# Patient Record
Sex: FEMALE | Race: BLACK OR AFRICAN AMERICAN | URBAN - METROPOLITAN AREA
[De-identification: names, ages, dates, MRNs, and addresses within clinical notes are randomized per-mention and may not be internally consistent; named-entity substitution may affect disease eponyms.]

---

## 2020-02-13 ENCOUNTER — OFFICE VISIT (OUTPATIENT)
Dept: CARDIOLOGY CLINIC | Age: 85
End: 2020-02-13

## 2020-02-13 ENCOUNTER — TELEPHONE (OUTPATIENT)
Dept: CARDIOLOGY CLINIC | Age: 85
End: 2020-02-13

## 2020-02-13 VITALS
BODY MASS INDEX: 27.94 KG/M2 | HEIGHT: 61 IN | HEART RATE: 60 BPM | WEIGHT: 148 LBS | OXYGEN SATURATION: 94 % | RESPIRATION RATE: 18 BRPM | SYSTOLIC BLOOD PRESSURE: 136 MMHG | DIASTOLIC BLOOD PRESSURE: 82 MMHG

## 2020-02-13 DIAGNOSIS — I25.10 ASHD (ARTERIOSCLEROTIC HEART DISEASE): Primary | ICD-10-CM

## 2020-02-13 DIAGNOSIS — I50.22 SYSTOLIC CHF, CHRONIC (HCC): ICD-10-CM

## 2020-02-13 DIAGNOSIS — R07.9 CHEST PAIN, UNSPECIFIED TYPE: ICD-10-CM

## 2020-02-13 RX ORDER — ASPIRIN 81 MG/1
TABLET ORAL DAILY
COMMUNITY

## 2020-02-13 RX ORDER — ROSUVASTATIN CALCIUM 10 MG/1
10 TABLET, COATED ORAL
COMMUNITY

## 2020-02-13 RX ORDER — FUROSEMIDE 20 MG/1
TABLET ORAL DAILY
COMMUNITY

## 2020-02-13 RX ORDER — MOMETASONE FUROATE 50 UG/1
2 SPRAY, METERED NASAL DAILY
COMMUNITY

## 2020-02-13 RX ORDER — RAMIPRIL 10 MG/1
10 CAPSULE ORAL DAILY
COMMUNITY

## 2020-02-13 RX ORDER — ISOSORBIDE MONONITRATE 60 MG/1
TABLET, EXTENDED RELEASE ORAL
COMMUNITY

## 2020-02-13 RX ORDER — CLOPIDOGREL BISULFATE 75 MG/1
75 TABLET ORAL DAILY
COMMUNITY

## 2020-02-13 RX ORDER — CARVEDILOL 25 MG/1
25 TABLET ORAL 2 TIMES DAILY WITH MEALS
COMMUNITY

## 2020-02-13 RX ORDER — COLCHICINE 0.6 MG/1
0.6 TABLET ORAL DAILY
COMMUNITY

## 2020-02-13 RX ORDER — ACETAMINOPHEN 325 MG/1
650 TABLET ORAL
COMMUNITY

## 2020-02-13 RX ORDER — ALLOPURINOL 100 MG/1
TABLET ORAL DAILY
COMMUNITY

## 2020-02-13 RX ORDER — AZELASTINE 1 MG/ML
1 SPRAY, METERED NASAL 2 TIMES DAILY
COMMUNITY

## 2020-02-13 RX ORDER — RANITIDINE 150 MG/1
150 TABLET, FILM COATED ORAL 2 TIMES DAILY
COMMUNITY

## 2020-02-13 RX ORDER — DOCUSATE SODIUM 100 MG/1
100 CAPSULE, LIQUID FILLED ORAL 2 TIMES DAILY
COMMUNITY

## 2020-02-13 RX ORDER — NAPROXEN SODIUM 220 MG
220 TABLET ORAL 2 TIMES DAILY WITH MEALS
COMMUNITY

## 2020-02-13 NOTE — TELEPHONE ENCOUNTER
Spoke with Sony Barrios at [facility] with Radiology, ph # [phone]. Cardiac cath was done 1- however, states it has not been read yet. Gio transferred me to [phone] to Radha Lambert. States we could not see cath films because they had not been released in the system. Have now been linked via [address] [place] [address] [address] and we should be able to pull up and see actual cath in [place]. Will call them back if there is a problem.

## 2020-02-13 NOTE — PROGRESS NOTES
[facility] @ [facility]    Betito Ly DNP, ANP-BC  Subjective/HPI:     Payton Templeton is a 80 y.o. female is here for new patient consultation. Patient was admitted to [facility] January 13, 2020 onset of chest tightness  with associated limiting dyspnea on exertion, increasing fatigue and increasing pedal edema. The ER work-up was notable for elevated troponin 0.68, elevated d-dimer with negative CTA of chest, bilateral lower extremity duplex negative for DVTs. Chest x-ray noted CHF pattern and was admitted. Underwent cardiac catheterization for non-STEMI as well as drop in ejection fraction previously normal most recent admission 44%. Cardiac catheterization as reported below shows a  of the proximal RCA. Since hospital discharge she has maintained carvedilol, ACE inhibitor, diuretic long-acting nitrates. She continues to experience fatigue and intermittent dyspnea, unable to clean her home as she did prior to December. She reports the edema has resolved. She is never had any orthopnea or paroxysmal nocturnal dyspnea. Cardiac Cath Report [facility] 1/2020 admission for NSTEMI  1. Coronary artery disease: Left main normal, left anterior   descending artery irregularities with total occlusion at the very   distal vessel at the apex and a small diameter heavily calcified   vessel not suitable for PCI, circumflex codominant with   irregularities, RCA codominant with proximal total occlusion and   a heavily calcified small diameter vessel.  I am uncertain if her   recent symptoms, troponin elevation, wall motion abnormalities   are related to ischemic event or possibly due to stress   cardiomyopathy.  She should have medical management. ECHO 1/2020 [place]  1. DECREASED LEFT VENTRICULAR CAVITY SIZE WITH MILDLY INCREASED WALL THICKNESS.    2. MILDLY REDUCED LEFT VENTRICULAR SYSTOLIC FUNCTION.  LATERAL AND ANTERIOR HYPOKINESIS.    3. EJECTION FRACTION IS 44%.  4. ABNORMAL DIASTOLIC FILLING PATTERN FOR AGE.    5. NORMAL RIGHT VENTRICULAR SIZE AND FUNCTION.    6. SEVERE MITRAL ANNULAR CALCIFICATION. MEAN TRANSVALVULAR GRADIENT IS 3 MMHG.    7. AORTIC VALVE SCLEROSIS WITHOUT STENOSIS.    8. ESTIMATED PULMONARY ARTERY PRESSURE IS 52  MMHG.        COMPARED TO PREVIOUS ECHO FROM 8/6/12: EF HAS CHANGED FROM 55-60% TO 44%, PAP HAS INCREASED    FROM 34 MMHG TO 52 MMHG      Lower Extremity Ultrasound: 1/2020  Addendum by Matthew Davis MD on 01/13/2020 6:50 PM    Bilateral: The deep venous system of the bilateral lower extremities was     examined using duplex ultrasound.      B-mode imaging demonstrates normal compressibility of the common femoral,     femoral, deep femoral, popliteal, posterior tibial, and peroneal veins.     There is no thrombus identified in the lower extremities.  Doppler flow     signals are spontaneous and pulsatile at all levels.    Right: A non-vascularized collection was noted in the right popliteal     fossa with mixed density echos measuring 4.0 cm x 0.657 cm. Venous     valvular reflux >500 ms was identified in the right great saphenous vein     at the saphenofemoral junction.    Left: A non-vascularized collection was noted in the left popliteal fossa     with mixed density echos measuring 3.83 cm x 1.26 cm.    Conclusions: No evidence of deep venous thrombosis in the bilateral lower     extremities.    Venous valvular reflux in the right as described.    Hemodynamics in the bilateral lower extremities are consistent with     increased central venous pressure.    Non-vascularized collections in the bilateral lower extremity as     described.    When compared to the previous exam performed on 8/1/2018, there is no     significant change.        PCP Provider  No primary care provider on file.   Past Medical History:   Diagnosis Date    ASHD (arteriosclerotic heart disease) 01/2020    NSTEMI,   RCA    Gout     High cholesterol     HTN (hypertension)       No past surgical history on file. Not on File   No family history on file. Current Outpatient Medications   Medication Sig    aspirin delayed-release 81 mg tablet Take  by mouth daily.  calcium-cholecalciferol, d3, (CALCIUM 600 + D) 600-125 mg-unit tab Take  by mouth.  colchicine 0.6 mg tablet Take 0.6 mg by mouth daily.  carvediloL (COREG) 25 mg tablet Take 25 mg by mouth two (2) times daily (with meals).  raNITIdine (ZANTAC) 150 mg tablet Take 150 mg by mouth two (2) times a day.  allopurinoL (ZYLOPRIM) 100 mg tablet Take  by mouth daily.  isosorbide mononitrate ER (IMDUR) 60 mg CR tablet Take  by mouth every morning.  clopidogreL (PLAVIX) 75 mg tab Take 75 mg by mouth daily.  furosemide (LASIX) 20 mg tablet Take  by mouth daily.  ramipril (ALTACE) 10 mg capsule Take 10 mg by mouth daily.  docusate sodium (STOOL SOFTENER) 100 mg capsule Take 100 mg by mouth two (2) times a day.  rosuvastatin (CRESTOR) 10 mg tablet Take 10 mg by mouth nightly.  azelastine (ASTELIN) 137 mcg (0.1 %) nasal spray 1 Spray two (2) times a day. Use in each nostril as directed    mometasone (NASONEX) 50 mcg/actuation nasal spray 2 Sprays daily.  acetaminophen (TYLENOL) 325 mg tablet Take 650 mg by mouth.  naproxen sodium (ALEVE) 220 mg tablet Take 220 mg by mouth two (2) times daily (with meals). No current facility-administered medications for this visit.        Vitals:    02/13/20 1433 02/13/20 1453 02/13/20 1530   BP: 154/66 152/66 136/82   Pulse: 60     Resp: 18     SpO2: 94%     Weight: 148 lb (67.1 kg)     Height: 5' 1\" (1.549 m)       Social History     Socioeconomic History    Marital status:      Spouse name: Not on file    Number of children: Not on file    Years of education: Not on file    Highest education level: Not on file   Occupational History    Not on file   Social Needs    Financial resource strain: Not on file    Food insecurity: Worry: Not on file     Inability: Not on file    Transportation needs:     Medical: Not on file     Non-medical: Not on file   Tobacco Use    Smoking status: Former Smoker     Packs/day: 1.00     Years: 20.00     Pack years: 20.00     Last attempt to quit: 1980     Years since quittin.0    Smokeless tobacco: Never Used   Substance and Sexual Activity    Alcohol use: Not Currently    Drug use: Not on file    Sexual activity: Not on file   Lifestyle    Physical activity:     Days per week: Not on file     Minutes per session: Not on file    Stress: Not on file   Relationships    Social connections:     Talks on phone: Not on file     Gets together: Not on file     Attends [employer] service: Not on file     Active member of club or organization: Not on file     Attends meetings of clubs or organizations: Not on file     Relationship status: Not on file    Intimate partner violence:     Fear of current or ex partner: Not on file     Emotionally abused: Not on file     Physically abused: Not on file     Forced sexual activity: Not on file   Other Topics Concern    Not on file   Social History Narrative    Not on file       I have reviewed the nurses notes, vitals, problem list, allergy list, medical history, family, social history and medications. Review of Symptoms:  11 systems reviewed, negative other than as stated in the HPI      Physical Exam:      General: Well developed, in no acute distress, cooperative and alert  HEENT: No carotid bruits, no JVD, trach is midline. Neck Supple, PERRL,   Heart:  Normal S1/S2 negative S3 or S4. Regular, 1/6 systolic murmur, no gallop or rub. Respiratory: Clear bilaterally x 4, no wheezing or rales  Abdomen:   Soft, non-tender, no masses, bowel sounds are active. Extremities:  Trace bilateral ankle edema, normal cap refill, no cyanosis, atraumatic. Neuro: A&Ox3, speech clear, gait slow/cautious   Skin: Skin color is normal. No rashes or lesions.  Non diaphoretic  Vascular: 2+ pulses symmetric in all extremities. Rt radial access site well healed. Cardiographics    ECG: Sinus, inferior infarct. Cardiology Labs:  No results found for: CHOL, CHOLX, CHLST, CHOLV, 143808, HDL, HDLP, LDL, LDLC, DLDLP, TGLX, TRIGL, TRIGP, CHHD, CHHDX    No results found for: NA, K, CL, CO2, AGAP, GLU, BUN, CREA, BUCR, GFRAA, GFRNA, CA, TBIL, TBILI, GPT, SGOT, AP, TP, ALB, GLOB, AGRAT, ALT        Assessment:     Assessment:     Diagnoses and all orders for this visit:    1. ASHD (arteriosclerotic heart disease)  -     AMB POC EKG ROUTINE W/ 12 LEADS, INTER & REP  -     MRI CARDIAC [facility] W WO CONT; Future  -     METABOLIC PANEL, COMPREHENSIVE  -     CK  -     LIPID PANEL    2. Chest pain, unspecified type  -     AMB POC EKG ROUTINE W/ 12 LEADS, INTER & REP  -     METABOLIC PANEL, COMPREHENSIVE  -     CK  -     LIPID PANEL    3. Systolic CHF, chronic (HCC)  -     METABOLIC PANEL, COMPREHENSIVE  -     CK  -     LIPID PANEL        ICD-10-CM ICD-9-CM    1. ASHD (arteriosclerotic heart disease) I25.10 414.00 AMB POC EKG ROUTINE W/ 12 LEADS, INTER & REP      MRI CARDIAC [facility] W WO CONT      METABOLIC PANEL, COMPREHENSIVE      CK      LIPID PANEL   2. Chest pain, unspecified type R07.9 786.50 AMB POC EKG ROUTINE W/ 12 LEADS, INTER & REP      METABOLIC PANEL, COMPREHENSIVE      CK      LIPID PANEL   3.  Systolic CHF, chronic (HCC) O67.05 968.70 METABOLIC PANEL, COMPREHENSIVE     428.0 CK      LIPID PANEL     Orders Placed This Encounter    MRI CARDIAC [facility] W WO CONT     [facility]     Standing Status:   Future     Standing Expiration Date:   3/13/2021     Order Specific Question:   STAT Creatinine as indicated     Answer:   Yes     Order Specific Question:   Reason for Exam     Answer:   MI, has  RCA looking for viability of inferior wall, newly dx CHF    METABOLIC PANEL, COMPREHENSIVE    CK    LIPID PANEL    AMB POC EKG ROUTINE W/ 12 LEADS, INTER & REP     Order Specific Question:   Reason for Exam:     Answer:   routine    aspirin delayed-release 81 mg tablet     Sig: Take  by mouth daily.  calcium-cholecalciferol, d3, (CALCIUM 600 + D) 600-125 mg-unit tab     Sig: Take  by mouth.  colchicine 0.6 mg tablet     Sig: Take 0.6 mg by mouth daily.  carvediloL (COREG) 25 mg tablet     Sig: Take 25 mg by mouth two (2) times daily (with meals).  raNITIdine (ZANTAC) 150 mg tablet     Sig: Take 150 mg by mouth two (2) times a day.  allopurinoL (ZYLOPRIM) 100 mg tablet     Sig: Take  by mouth daily.  isosorbide mononitrate ER (IMDUR) 60 mg CR tablet     Sig: Take  by mouth every morning.  clopidogreL (PLAVIX) 75 mg tab     Sig: Take 75 mg by mouth daily.  furosemide (LASIX) 20 mg tablet     Sig: Take  by mouth daily.  ramipril (ALTACE) 10 mg capsule     Sig: Take 10 mg by mouth daily.  docusate sodium (STOOL SOFTENER) 100 mg capsule     Sig: Take 100 mg by mouth two (2) times a day.  rosuvastatin (CRESTOR) 10 mg tablet     Sig: Take 10 mg by mouth nightly.  azelastine (ASTELIN) 137 mcg (0.1 %) nasal spray     Si Spray two (2) times a day. Use in each nostril as directed    mometasone (NASONEX) 50 mcg/actuation nasal spray     Si Sprays daily.  acetaminophen (TYLENOL) 325 mg tablet     Sig: Take 650 mg by mouth.  naproxen sodium (ALEVE) 220 mg tablet     Sig: Take 220 mg by mouth two (2) times daily (with meals). Plan:     1. Atherosclerotic heart disease: Status post non-STEMI 2020, cardiac catheterization demonstrates  of the proximal RCA. Has been placed on medical management initially from [facility] including carvedilol, nitrates.   She continues with intermittent episodes of limiting dyspnea on exertion and fatigue consistent with [place] heart class IIIb heart failure limiting GUTIERREZ as angina F/C III, (currently on dual antianginal therapy)   Continue current therapy, will proceed with cardiac MRI viability study of the inferior wall. If the inferior wall is viable will then refer patient to Dr. Bouchra Almonte for  attempt of the proximal RCA. If myocardium is nonviable will add ranexa. Continue dual antiplatelet therapy. 2.  Hypertension: At the end of visit blood pressure 136/82, continue current medications, consider addition of amlodipine at next visit. 3.  Hyperlipidemia: Now on Crestor 10 mg, repeat lipid panel in 3 months. 4.  Systolic heart failure: Ejection fraction 44%, ischemic cardiomyopathy continue carvedilol, ACE inhibitor, furosemide and long-acting nitrates. Will plan for repeat limited ECHO for EF later this year. Follow-up in 6 weeks after cardiac MRI to determine future treatment plan. Requesting CD of cardiac catheterization. Jenny Garrido NP      Please note that this dictation was completed with groopify, the computer voice recognition software. Quite often unanticipated grammatical, syntax, homophones, and other interpretive errors are inadvertently transcribed by the computer software. Please disregard these errors. Please excuse any errors that have escaped final proofreading. Thank you.

## 2020-02-13 NOTE — PROGRESS NOTES
1. Have you been to the ER, urgent care clinic since your last visit? Hospitalized since your last visit? S/P admit,  [facility], 2 wks ago. 2. Have you seen or consulted any other health care providers outside of the [address] [place] [address] since your last visit? Include any pap smears or colon screening. No.    Chief Complaint   Patient presents with    Coronary Artery Disease     C/O CP & SOB with exertion.

## 2020-02-14 NOTE — TELEPHONE ENCOUNTER
Spoke with Sejal at [phone] at [facility]. Verified patient with two patient identifiers. She needed to load our address and CANDICE Dunlap's E-mail into their computer system. Aristeo Chase needs to \"accept\" on the link via [address] [place] [address] [address], then she can load them, and we should be able to pull up in [facility]. Aristeo Chase- pls let me know if you are able to finally view cath films and then I will let Sejal know it worked. Thanks.

## 2020-02-24 NOTE — TELEPHONE ENCOUNTER
Spoke with Sejal with cath lab at [facility]. Ph # [phone]. Fax # [phone]. Verified patient with two patient identifiers. Samanta Connelly NP still unable to view cath films. Sejal will mail us the disc to [facility] office. Faxed release with address to mail disc to us.

## 2020-03-23 ENCOUNTER — TELEPHONE (OUTPATIENT)
Dept: CARDIOLOGY CLINIC | Age: 85
End: 2020-03-23

## 2020-03-23 DIAGNOSIS — I25.118 CORONARY ARTERY DISEASE OF NATIVE ARTERY OF NATIVE HEART WITH STABLE ANGINA PECTORIS (HCC): Primary | ICD-10-CM

## 2020-03-23 NOTE — TELEPHONE ENCOUNTER
Spoke with [employer], on HPI. Verified patient with two patient identifiers. Advised to cancel appt for Thurs 3-. Get cardiac MRI first.  Advised to sign up on MyChart, pt and jacque state they have no desire to do so. She lives alone and cannot understand the computer. Will call pt with results of MRI once done. Call us in the interim if she has problems. Ford verbalizied understanding.

## 2020-03-23 NOTE — TELEPHONE ENCOUNTER
Called Otilia santillan, on [facility], ph # [phone]. Verified patient with two patient identifiers. States MRI has never been scheduled. Called Luz Elena Parada with Central Scheduling at [phone]. States cardiac MRI has not been scheduled as it should say ancillary performed. They are unable to view the order since it says hospital performed. Will ask Donaldo Srinivasan NP to enter order under Ancillary. Pt has appt at the [facility] Thurs 3-.  ? Keep appt.

## 2020-03-31 ENCOUNTER — TELEPHONE (OUTPATIENT)
Dept: CARDIOLOGY CLINIC | Age: 85
End: 2020-03-31

## 2020-03-31 NOTE — TELEPHONE ENCOUNTER
Central scheduling called request if okay to schedule MRI after May 26, 2020. Informed with speak with Henry Reynoso NP and advise.

## 2020-03-31 NOTE — TELEPHONE ENCOUNTER
Amy Peguero NP  You 17 minutes ago (11:31 AM)      CAN WAIT        Returned call to  in central scheduling left message on voicemail informing test not urgent, can schedule out.

## 2022-03-18 PROBLEM — I25.10 ASHD (ARTERIOSCLEROTIC HEART DISEASE): Status: ACTIVE | Noted: 2020-01-01

## 2023-05-24 RX ORDER — AZELASTINE 1 MG/ML
1 SPRAY, METERED NASAL 2 TIMES DAILY
COMMUNITY

## 2023-05-24 RX ORDER — RAMIPRIL 10 MG/1
10 CAPSULE ORAL DAILY
COMMUNITY

## 2023-05-24 RX ORDER — PSEUDOEPHEDRINE HCL 30 MG
100 TABLET ORAL 2 TIMES DAILY
COMMUNITY

## 2023-05-24 RX ORDER — NAPROXEN SODIUM 220 MG
220 TABLET ORAL 2 TIMES DAILY WITH MEALS
COMMUNITY

## 2023-05-24 RX ORDER — ACETAMINOPHEN 325 MG/1
650 TABLET ORAL
COMMUNITY

## 2023-05-24 RX ORDER — CLOPIDOGREL BISULFATE 75 MG/1
75 TABLET ORAL DAILY
COMMUNITY

## 2023-05-24 RX ORDER — FUROSEMIDE 20 MG/1
TABLET ORAL DAILY
COMMUNITY

## 2023-05-24 RX ORDER — RANITIDINE 150 MG/1
150 TABLET ORAL 2 TIMES DAILY
COMMUNITY

## 2023-05-24 RX ORDER — ALLOPURINOL 100 MG/1
TABLET ORAL DAILY
COMMUNITY

## 2023-05-24 RX ORDER — ASPIRIN 81 MG/1
TABLET ORAL DAILY
COMMUNITY

## 2023-05-24 RX ORDER — ROSUVASTATIN CALCIUM 10 MG/1
10 TABLET, COATED ORAL NIGHTLY
COMMUNITY

## 2023-05-24 RX ORDER — COLCHICINE 0.6 MG/1
0.6 TABLET ORAL DAILY
COMMUNITY

## 2023-05-24 RX ORDER — CARVEDILOL 25 MG/1
25 TABLET ORAL 2 TIMES DAILY WITH MEALS
COMMUNITY

## 2023-05-24 RX ORDER — MOMETASONE FUROATE 50 UG/1
2 SPRAY, METERED NASAL DAILY
COMMUNITY

## 2023-05-24 RX ORDER — ISOSORBIDE MONONITRATE 60 MG/1
TABLET, EXTENDED RELEASE ORAL
COMMUNITY